# Patient Record
Sex: FEMALE | Race: WHITE | Employment: UNEMPLOYED | URBAN - METROPOLITAN AREA
[De-identification: names, ages, dates, MRNs, and addresses within clinical notes are randomized per-mention and may not be internally consistent; named-entity substitution may affect disease eponyms.]

---

## 2018-04-06 PROBLEM — G56.01 RIGHT CARPAL TUNNEL SYNDROME: Status: ACTIVE | Noted: 2018-04-06

## 2018-04-19 PROBLEM — M79.642 PAIN IN BOTH HANDS: Status: ACTIVE | Noted: 2018-04-19

## 2018-04-19 PROBLEM — M79.641 PAIN IN BOTH HANDS: Status: ACTIVE | Noted: 2018-04-19

## 2018-04-27 PROBLEM — G56.03 BILATERAL CARPAL TUNNEL SYNDROME: Status: ACTIVE | Noted: 2018-04-27

## 2018-05-03 ENCOUNTER — ANESTHESIA EVENT (OUTPATIENT)
Dept: SURGERY | Facility: HOSPITAL | Age: 28
End: 2018-05-03
Payer: MEDICAID

## 2018-05-03 ENCOUNTER — SURGERY (OUTPATIENT)
Age: 28
End: 2018-05-03

## 2018-05-03 ENCOUNTER — HOSPITAL ENCOUNTER (OUTPATIENT)
Facility: HOSPITAL | Age: 28
Setting detail: HOSPITAL OUTPATIENT SURGERY
Discharge: HOME | End: 2018-05-03
Attending: ORTHOPAEDIC SURGERY | Admitting: ORTHOPAEDIC SURGERY
Payer: MEDICAID

## 2018-05-03 ENCOUNTER — ANESTHESIA (OUTPATIENT)
Dept: SURGERY | Facility: HOSPITAL | Age: 28
End: 2018-05-03
Payer: MEDICAID

## 2018-05-03 VITALS
WEIGHT: 209 LBS | HEART RATE: 70 BPM | DIASTOLIC BLOOD PRESSURE: 65 MMHG | TEMPERATURE: 98 F | RESPIRATION RATE: 18 BRPM | BODY MASS INDEX: 35.68 KG/M2 | SYSTOLIC BLOOD PRESSURE: 107 MMHG | HEIGHT: 64 IN | OXYGEN SATURATION: 100 %

## 2018-05-03 DIAGNOSIS — G56.01 CARPAL TUNNEL SYNDROME ON RIGHT: ICD-10-CM

## 2018-05-03 PROCEDURE — 81025 URINE PREGNANCY TEST: CPT | Performed by: ORTHOPAEDIC SURGERY

## 2018-05-03 PROCEDURE — 01N50ZZ RELEASE MEDIAN NERVE, OPEN APPROACH: ICD-10-PCS | Performed by: ORTHOPAEDIC SURGERY

## 2018-05-03 RX ORDER — ACETAMINOPHEN 500 MG
1000 TABLET ORAL EVERY 6 HOURS PRN
COMMUNITY

## 2018-05-03 RX ORDER — LABETALOL HYDROCHLORIDE 5 MG/ML
5 INJECTION, SOLUTION INTRAVENOUS EVERY 5 MIN PRN
Status: DISCONTINUED | OUTPATIENT
Start: 2018-05-03 | End: 2018-05-03

## 2018-05-03 RX ORDER — HYDROMORPHONE HYDROCHLORIDE 1 MG/ML
0.4 INJECTION, SOLUTION INTRAMUSCULAR; INTRAVENOUS; SUBCUTANEOUS EVERY 5 MIN PRN
Status: DISCONTINUED | OUTPATIENT
Start: 2018-05-03 | End: 2018-05-03

## 2018-05-03 RX ORDER — SODIUM CHLORIDE, SODIUM LACTATE, POTASSIUM CHLORIDE, CALCIUM CHLORIDE 600; 310; 30; 20 MG/100ML; MG/100ML; MG/100ML; MG/100ML
INJECTION, SOLUTION INTRAVENOUS CONTINUOUS
Status: DISCONTINUED | OUTPATIENT
Start: 2018-05-03 | End: 2018-05-03

## 2018-05-03 RX ORDER — DIPHENHYDRAMINE HYDROCHLORIDE 50 MG/ML
12.5 INJECTION INTRAMUSCULAR; INTRAVENOUS AS NEEDED
Status: DISCONTINUED | OUTPATIENT
Start: 2018-05-03 | End: 2018-05-03

## 2018-05-03 RX ORDER — LIDOCAINE HYDROCHLORIDE AND EPINEPHRINE 10; 10 MG/ML; UG/ML
INJECTION, SOLUTION INFILTRATION; PERINEURAL AS NEEDED
Status: DISCONTINUED | OUTPATIENT
Start: 2018-05-03 | End: 2018-05-03 | Stop reason: HOSPADM

## 2018-05-03 RX ORDER — HYDROCODONE BITARTRATE AND ACETAMINOPHEN 5; 325 MG/1; MG/1
2 TABLET ORAL AS NEEDED
Status: DISCONTINUED | OUTPATIENT
Start: 2018-05-03 | End: 2018-05-03

## 2018-05-03 RX ORDER — HYDROCODONE BITARTRATE AND ACETAMINOPHEN 5; 325 MG/1; MG/1
1 TABLET ORAL AS NEEDED
Status: DISCONTINUED | OUTPATIENT
Start: 2018-05-03 | End: 2018-05-03

## 2018-05-03 RX ORDER — MIDAZOLAM HYDROCHLORIDE 1 MG/ML
1 INJECTION INTRAMUSCULAR; INTRAVENOUS EVERY 5 MIN PRN
Status: DISCONTINUED | OUTPATIENT
Start: 2018-05-03 | End: 2018-05-03

## 2018-05-03 RX ORDER — ACETAMINOPHEN AND CODEINE PHOSPHATE 300; 30 MG/1; MG/1
1-2 TABLET ORAL EVERY 6 HOURS PRN
Qty: 10 TABLET | Refills: 1 | Status: SHIPPED | OUTPATIENT
Start: 2018-05-03

## 2018-05-03 RX ORDER — METOCLOPRAMIDE HYDROCHLORIDE 5 MG/ML
10 INJECTION INTRAMUSCULAR; INTRAVENOUS AS NEEDED
Status: DISCONTINUED | OUTPATIENT
Start: 2018-05-03 | End: 2018-05-03

## 2018-05-03 RX ORDER — ONDANSETRON 2 MG/ML
4 INJECTION INTRAMUSCULAR; INTRAVENOUS AS NEEDED
Status: DISCONTINUED | OUTPATIENT
Start: 2018-05-03 | End: 2018-05-03

## 2018-05-03 RX ORDER — MEPERIDINE HYDROCHLORIDE 25 MG/ML
12.5 INJECTION INTRAMUSCULAR; INTRAVENOUS; SUBCUTANEOUS AS NEEDED
Status: DISCONTINUED | OUTPATIENT
Start: 2018-05-03 | End: 2018-05-03

## 2018-05-03 RX ORDER — DEXAMETHASONE SODIUM PHOSPHATE 4 MG/ML
4 VIAL (ML) INJECTION AS NEEDED
Status: DISCONTINUED | OUTPATIENT
Start: 2018-05-03 | End: 2018-05-03

## 2018-05-03 RX ORDER — CEFAZOLIN SODIUM/WATER 2 G/20 ML
2 SYRINGE (ML) INTRAVENOUS ONCE
Status: DISCONTINUED | OUTPATIENT
Start: 2018-05-03 | End: 2018-05-03 | Stop reason: HOSPADM

## 2018-05-03 RX ORDER — NALOXONE HYDROCHLORIDE 0.4 MG/ML
80 INJECTION, SOLUTION INTRAMUSCULAR; INTRAVENOUS; SUBCUTANEOUS AS NEEDED
Status: DISCONTINUED | OUTPATIENT
Start: 2018-05-03 | End: 2018-05-03

## 2018-05-03 NOTE — BRIEF OP NOTE
Pre-Operative Diagnosis: Carpal tunnel syndrome on right [G56.01]     Post-Operative Diagnosis: Carpal tunnel syndrome on right [G56.01]      Procedure Performed:   Procedure(s):  RIGHT CARPAL TUNNEL RELEASE    Surgeon(s) and Role:     Lita Weber MD

## 2018-05-03 NOTE — OPERATIVE REPORT
[address]    PATIENT'S NAME: Rhys Webber   ATTENDING PHYSICIAN: Xavier Radford M.D. OPERATING PHYSICIAN: Xavier Radford M.D.    PATIENT ACCOUNT#:   [de-identified]    LOCATION:  PREOPASCC  PRE ASCC 9 EDWP 10  MEDICAL RECORD #:   [id number]       DA inflated to 250 mmHg. The area surrounding the incision was infiltrated with lidocaine with epinephrine. A longitudinal incision in the proximal aspect of the palm paralleling the thenar crease was performed.   Dissection was carried down through the soft

## 2018-05-03 NOTE — ANESTHESIA PREPROCEDURE EVALUATION
PRE-OP EVALUATION    Patient Name: Brandan Urbano    Pre-op Diagnosis: Carpal tunnel syndrome on right [G56.01]    Procedure(s):  RIGHT CARPAL TUNNEL RELEASE    Surgeon(s) and Role:     Danilea Bradshaw MD - Primary    Pre-op vitals reviewed.   Temp: 98 Patient does not meet NPO guidelines. (Case delayed until 1130 to follow guidelines.   Clears at 0930.)          Plan/risks discussed with: patient and spouse                Present on Admission:  **None**

## 2018-05-03 NOTE — H&P
[id number] Patient Status:  Hospital Outpatient Surgery    1990 MRN [id number]   Location [facility] [employer] Attending Romeo Hodges MD   Hosp Day # 0 PCP No primary care provider on file.      His and turgor. Lymphatic:  No palpable cervical lymphadenopathy. Neurologic: Cranial nerves are grossly intact. Motor strength and sensory examination is grossly normal.  No focal neurologic deficit.     Laboratory Data:  EMG with CTS    Impression and Plan

## 2018-05-11 NOTE — ANESTHESIA POSTPROCEDURE EVALUATION
[address] [place] [address] Patient Status:  Hospital Outpatient Surgery   Age/Gender 33year old female MRN [id number]   Location [facility] Attending No att. providers found   [address] Day # 0 PCP No primary car

## 2019-09-14 ENCOUNTER — WALK IN (OUTPATIENT)
Dept: URGENT CARE | Age: 29
End: 2019-09-14

## 2019-09-14 VITALS
RESPIRATION RATE: 24 BRPM | TEMPERATURE: 102.3 F | DIASTOLIC BLOOD PRESSURE: 78 MMHG | SYSTOLIC BLOOD PRESSURE: 110 MMHG | HEART RATE: 104 BPM

## 2019-09-14 DIAGNOSIS — J02.0 STREP PHARYNGITIS: Primary | ICD-10-CM

## 2019-09-14 LAB
INTERNAL PROCEDURAL CONTROLS ACCEPTABLE: YES
S PYO AG THROAT QL IA.RAPID: POSITIVE

## 2019-09-14 PROCEDURE — 99203 OFFICE O/P NEW LOW 30 MIN: CPT | Performed by: NURSE PRACTITIONER

## 2019-09-14 PROCEDURE — 87880 STREP A ASSAY W/OPTIC: CPT | Performed by: NURSE PRACTITIONER

## 2019-09-14 RX ORDER — AMOXICILLIN 875 MG/1
875 TABLET, COATED ORAL 2 TIMES DAILY
Qty: 20 TABLET | Refills: 0 | Status: SHIPPED | OUTPATIENT
Start: 2019-09-14 | End: 2019-09-24

## 2019-09-14 ASSESSMENT — ENCOUNTER SYMPTOMS
RHINORRHEA: 1
SHORTNESS OF BREATH: 0
DIAPHORESIS: 0
WHEEZING: 0
FEVER: 1
FATIGUE: 1
CHILLS: 1
SORE THROAT: 1
COUGH: 1

## 2024-03-07 PROCEDURE — 93010 ELECTROCARDIOGRAM REPORT: CPT | Performed by: INTERNAL MEDICINE

## (undated) DEVICE — ZIMMER® STERILE DISPOSABLE TOURNIQUET CUFF WITH PLC, DUAL PORT, SINGLE BLADDER, 18 IN. (46 CM)

## (undated) DEVICE — UPPER EXTREMITY CDS-LF: Brand: MEDLINE INDUSTRIES, INC.

## (undated) DEVICE — GLOVE BIOGEL M SURG SZ 8

## (undated) DEVICE — KNIFE CARPAL TUNNEL 08-0005

## (undated) DEVICE — SUTURE ETHILON 4-0 PS-2

## (undated) DEVICE — PADDING CAST COTTON  4

## (undated) DEVICE — GLOVE SURG TRIUMPH SZ 71/2

## (undated) DEVICE — STANDARD HYPODERMIC NEEDLE,POLYPROPYLENE HUB: Brand: MONOJECT

## (undated) DEVICE — KENDALL SCD EXPRESS SLEEVES, KNEE LENGTH, MEDIUM: Brand: KENDALL SCD

## (undated) DEVICE — GOWN SURG AERO CHROME XXL

## (undated) DEVICE — CONVERTORS STOCKINETTE: Brand: CONVERTORS

## (undated) DEVICE — OCCLUSIVE GAUZE STRIP OVERWRAP,3% BISMUTH TRIBROMOPHENATE IN PETROLATUM BLEND: Brand: XEROFORM

## (undated) DEVICE — REM POLYHESIVE ADULT PATIENT RETURN ELECTRODE: Brand: VALLEYLAB

## (undated) DEVICE — SOL  .9 1000ML BTL

## (undated) DEVICE — GAUZE SPONGES,USP TYPE VII GAUZE, 12 PLY: Brand: CURITY